# Patient Record
Sex: MALE | Race: WHITE
[De-identification: names, ages, dates, MRNs, and addresses within clinical notes are randomized per-mention and may not be internally consistent; named-entity substitution may affect disease eponyms.]

---

## 2018-05-04 ENCOUNTER — HOSPITAL ENCOUNTER (EMERGENCY)
Dept: HOSPITAL 91 - E/R | Age: 23
Discharge: HOME | End: 2018-05-04
Payer: MEDICAID

## 2018-05-04 ENCOUNTER — HOSPITAL ENCOUNTER (EMERGENCY)
Age: 23
Discharge: HOME | End: 2018-05-04

## 2018-05-04 DIAGNOSIS — R21: Primary | ICD-10-CM

## 2018-05-04 DIAGNOSIS — J45.909: ICD-10-CM

## 2018-05-04 PROCEDURE — 99284 EMERGENCY DEPT VISIT MOD MDM: CPT

## 2022-11-04 ENCOUNTER — HOSPITAL ENCOUNTER (INPATIENT)
Dept: HOSPITAL 54 - ER | Age: 27
LOS: 2 days | Discharge: LEFT BEFORE DISCHARGE | DRG: 917 | End: 2022-11-06
Attending: INTERNAL MEDICINE | Admitting: NURSE PRACTITIONER
Payer: COMMERCIAL

## 2022-11-04 VITALS — SYSTOLIC BLOOD PRESSURE: 129 MMHG | DIASTOLIC BLOOD PRESSURE: 81 MMHG

## 2022-11-04 VITALS — BODY MASS INDEX: 27.62 KG/M2 | WEIGHT: 197.31 LBS | HEIGHT: 71 IN

## 2022-11-04 VITALS — DIASTOLIC BLOOD PRESSURE: 80 MMHG | SYSTOLIC BLOOD PRESSURE: 128 MMHG

## 2022-11-04 VITALS — DIASTOLIC BLOOD PRESSURE: 81 MMHG | SYSTOLIC BLOOD PRESSURE: 129 MMHG

## 2022-11-04 DIAGNOSIS — Z88.8: ICD-10-CM

## 2022-11-04 DIAGNOSIS — Z53.29: ICD-10-CM

## 2022-11-04 DIAGNOSIS — J96.01: ICD-10-CM

## 2022-11-04 DIAGNOSIS — F29: ICD-10-CM

## 2022-11-04 DIAGNOSIS — F90.9: ICD-10-CM

## 2022-11-04 DIAGNOSIS — M62.82: ICD-10-CM

## 2022-11-04 DIAGNOSIS — J69.0: ICD-10-CM

## 2022-11-04 DIAGNOSIS — T43.622A: Primary | ICD-10-CM

## 2022-11-04 DIAGNOSIS — R45.1: ICD-10-CM

## 2022-11-04 DIAGNOSIS — E87.20: ICD-10-CM

## 2022-11-04 DIAGNOSIS — Z88.5: ICD-10-CM
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
## 2022-11-04 DIAGNOSIS — Z88.0: ICD-10-CM

## 2022-11-04 DIAGNOSIS — N17.0: ICD-10-CM

## 2022-11-04 DIAGNOSIS — G92.8: ICD-10-CM

## 2022-11-04 LAB
ALBUMIN SERPL BCP-MCNC: 4.2 G/DL (ref 3.4–5)
ALP SERPL-CCNC: 93 U/L (ref 46–116)
ALT SERPL W P-5'-P-CCNC: 34 U/L (ref 12–78)
APAP SERPL-MCNC: < 10 UG/ML (ref 10–30)
AST SERPL W P-5'-P-CCNC: 40 U/L (ref 15–37)
BASE EXCESS BLDA CALC-SCNC: -5 MMOL/L
BASOPHILS # BLD AUTO: 0 K/UL (ref 0–0.2)
BASOPHILS NFR BLD AUTO: 0.2 % (ref 0–2)
BILIRUB DIRECT SERPL-MCNC: 0.2 MG/DL (ref 0–0.2)
BILIRUB SERPL-MCNC: 0.6 MG/DL (ref 0.2–1)
BILIRUB UR QL STRIP: (no result)
BUN SERPL-MCNC: 9 MG/DL (ref 7–18)
CALCIUM SERPL-MCNC: 9.4 MG/DL (ref 8.5–10.1)
CHLORIDE SERPL-SCNC: 106 MMOL/L (ref 98–107)
CK SERPL-CCNC: 674 U/L (ref 39–308)
CO2 SERPL-SCNC: 26 MMOL/L (ref 21–32)
COLOR UR: YELLOW
CREAT SERPL-MCNC: 1.5 MG/DL (ref 0.6–1.3)
DEPRECATED SQUAMOUS URNS QL MICRO: (no result) /HPF
EOSINOPHIL NFR BLD AUTO: 0.1 % (ref 0–6)
ETHANOL SERPL-MCNC: < 3 MG/DL (ref 0–0)
GLUCOSE SERPL-MCNC: 103 MG/DL (ref 74–106)
GLUCOSE UR STRIP-MCNC: NEGATIVE MG/DL
HCT VFR BLD AUTO: 46 % (ref 39–51)
HGB BLD-MCNC: 16 G/DL (ref 13.5–17.5)
INHALED O2 CONCENTRATION: 100 %
INTRINSIC PEEP RESPIRATORY: 5 CM H2O
LEUKOCYTE ESTERASE UR QL STRIP: NEGATIVE
LYMPHOCYTES NFR BLD AUTO: 1.9 K/UL (ref 0.8–4.8)
LYMPHOCYTES NFR BLD AUTO: 10.2 % (ref 20–44)
MCHC RBC AUTO-ENTMCNC: 35 G/DL (ref 31–36)
MCV RBC AUTO: 86 FL (ref 80–96)
MONOCYTES NFR BLD AUTO: 0.9 K/UL (ref 0.1–1.3)
MONOCYTES NFR BLD AUTO: 4.7 % (ref 2–12)
NEUTROPHILS # BLD AUTO: 15.8 K/UL (ref 1.8–8.9)
NEUTROPHILS NFR BLD AUTO: 84.8 % (ref 43–81)
NITRITE UR QL STRIP: NEGATIVE
PCO2 TEMP ADJ BLDA: 41.2 MMHG (ref 35–45)
PEEP SETTING VENT: 500 ML
PH TEMP ADJ BLDA: 7.32 [PH] (ref 7.35–7.45)
PH UR STRIP: 6 [PH] (ref 5–8)
PLATELET # BLD AUTO: 342 K/UL (ref 150–450)
PO2 TEMP ADJ BLDA: 397.2 MMHG (ref 75–100)
POTASSIUM SERPL-SCNC: 3.7 MMOL/L (ref 3.5–5.1)
PROT SERPL-MCNC: 7.8 G/DL (ref 6.4–8.2)
PROT UR QL STRIP: (no result) MG/DL
RBC # BLD AUTO: 5.34 MIL/UL (ref 4.5–6)
RBC #/AREA URNS HPF: (no result) /HPF (ref 0–2)
SET RATE, BG: 20
SODIUM SERPL-SCNC: 145 MMOL/L (ref 136–145)
UROBILINOGEN UR STRIP-MCNC: 2 EU/DL
VENTILATION MODE VENT: (no result)
WBC #/AREA URNS HPF: (no result) /HPF
WBC #/AREA URNS HPF: (no result) /HPF (ref 0–3)
WBC NRBC COR # BLD AUTO: 18.6 K/UL (ref 4.3–11)

## 2022-11-04 PROCEDURE — G0480 DRUG TEST DEF 1-7 CLASSES: HCPCS

## 2022-11-04 PROCEDURE — 0BH17EZ INSERTION OF ENDOTRACHEAL AIRWAY INTO TRACHEA, VIA NATURAL OR ARTIFICIAL OPENING: ICD-10-PCS | Performed by: EMERGENCY MEDICINE

## 2022-11-04 PROCEDURE — G0378 HOSPITAL OBSERVATION PER HR: HCPCS

## 2022-11-04 PROCEDURE — C9113 INJ PANTOPRAZOLE SODIUM, VIA: HCPCS

## 2022-11-04 PROCEDURE — 5A1945Z RESPIRATORY VENTILATION, 24-96 CONSECUTIVE HOURS: ICD-10-PCS

## 2022-11-04 RX ADMIN — ENOXAPARIN SODIUM SCH MG: 40 INJECTION SUBCUTANEOUS at 22:47

## 2022-11-04 RX ADMIN — PROPOFOL PRN MLS/HR: 10 INJECTION, EMULSION INTRAVENOUS at 22:44

## 2022-11-04 RX ADMIN — PROPOFOL PRN MLS/HR: 10 INJECTION, EMULSION INTRAVENOUS at 23:43

## 2022-11-04 RX ADMIN — SODIUM CHLORIDE PRN MLS/HR: 9 INJECTION, SOLUTION INTRAVENOUS at 22:38

## 2022-11-04 RX ADMIN — PIPERACILLIN SODIUM AND TAZOBACTAM SODIUM SCH MLS/HR: .375; 3 INJECTION, POWDER, LYOPHILIZED, FOR SOLUTION INTRAVENOUS at 22:45

## 2022-11-04 NOTE — NUR
PATIENT STARTS MOVING- AWAKE ABOVE PROPOFOL INF. INCREASED- TITRATED TO EFFECT 
BP-135/83 CA-106 SATS-100%

## 2022-11-04 NOTE — NUR
CALLED POISON CONTROL [phone] PER [facility] AMID: 

CT OF ABDOMIN & PELVIC IF BAGGIE FOUND DO ACTIVATED CHARCOAL. IF NO BAGGIE,  
CONTINUE SUPPORTIVE CARE. PLACE SEIZURE PRECAUTION IN PLACE. IN UNLIKELY EVENT 
OF HYPERTHERMIA USE COOLING MEASSURE. CHECK CK FOR RHABDO. ALSO, DO LABS FOR 
TYLENOL & ASA.

## 2022-11-04 NOTE — NUR
PT BIBRA FROM STREETS ACCOMPANIED BY [employer], ANXIOUS AND RESTLESS APPEARING, 
TACHYCARDIC AND DIAPHORETIC PTA. PER REPORT, PT INGESTED UNKNOWN QUANTITY OF 
POSSIBLE "METHAMPETHAMINES"  WHILW BEING ARRESTED. ABRASSIONS NOTED TO R SIDED 
CHEST AND FLANK AREA, PT IS VERBALLY RESPONSIVE. GOWNED AND PLACED ON MONITOR.

## 2022-11-04 NOTE — NUR
Pt Escorted by [employer] in Handcuffs Appears highly Anxious/gets easily 
agitated/restless/sweating. Skin color flushed. Tends to hyperventilate at 
times. MD at bedside on arrival with orders

## 2022-11-04 NOTE — NUR
RT NOTE

FIO2 TITRATED TO 40% FIO2


-------------------------------------------------------------------------------

Addendum: 11/04/22 at 2350 by MALIK SMITH RT

-------------------------------------------------------------------------------

Amended: Links added.

## 2022-11-04 NOTE — NUR
Patient became altered and unable to respond to verbal/painful stimuli. 
Obtunded. Dr Alfaro made aware with orders to prepare for RSI

## 2022-11-04 NOTE — NUR
PT IS NOTED W UNCONTROLLED GENERALIZED BODY TREMORS, TACHYCARDIC IN 'S 
DR KEEN MADE AWARE. ATIVAN 2MG IVP GIVEN PER ERMD VERBAL ORDER.

## 2022-11-04 NOTE — NUR
RT



Pt intubated in ER #5 by MD Alfaro, with 7.5 ETT- 25cm @ Lip. Settings provided by MD- 
tolerating well. ETT placement verified via X-Ray + Bilateral breath sounds. Ambu bag at 
bedside. 1 hr post intubation ABG scheduled @1930. Will continue to monitor.

## 2022-11-04 NOTE — NUR
RT NOTE

LATE ENTRY

ABG taken and results given to MD. Fio2 titrated to 50% per md request.

-------------------------------------------------------------------------------

Addendum: 11/04/22 at 2350 by MALIK SMITH RT

-------------------------------------------------------------------------------

Amended: Links added.

## 2022-11-05 VITALS — DIASTOLIC BLOOD PRESSURE: 78 MMHG | SYSTOLIC BLOOD PRESSURE: 117 MMHG

## 2022-11-05 VITALS — SYSTOLIC BLOOD PRESSURE: 113 MMHG | DIASTOLIC BLOOD PRESSURE: 74 MMHG

## 2022-11-05 VITALS — SYSTOLIC BLOOD PRESSURE: 118 MMHG | DIASTOLIC BLOOD PRESSURE: 71 MMHG

## 2022-11-05 VITALS — DIASTOLIC BLOOD PRESSURE: 77 MMHG | SYSTOLIC BLOOD PRESSURE: 117 MMHG

## 2022-11-05 VITALS — SYSTOLIC BLOOD PRESSURE: 125 MMHG | DIASTOLIC BLOOD PRESSURE: 78 MMHG

## 2022-11-05 VITALS — SYSTOLIC BLOOD PRESSURE: 116 MMHG | DIASTOLIC BLOOD PRESSURE: 75 MMHG

## 2022-11-05 VITALS — SYSTOLIC BLOOD PRESSURE: 123 MMHG | DIASTOLIC BLOOD PRESSURE: 81 MMHG

## 2022-11-05 VITALS — DIASTOLIC BLOOD PRESSURE: 76 MMHG | SYSTOLIC BLOOD PRESSURE: 122 MMHG

## 2022-11-05 VITALS — SYSTOLIC BLOOD PRESSURE: 121 MMHG | DIASTOLIC BLOOD PRESSURE: 83 MMHG

## 2022-11-05 VITALS — DIASTOLIC BLOOD PRESSURE: 85 MMHG | SYSTOLIC BLOOD PRESSURE: 124 MMHG

## 2022-11-05 VITALS — SYSTOLIC BLOOD PRESSURE: 114 MMHG | DIASTOLIC BLOOD PRESSURE: 70 MMHG

## 2022-11-05 VITALS — DIASTOLIC BLOOD PRESSURE: 72 MMHG | SYSTOLIC BLOOD PRESSURE: 112 MMHG

## 2022-11-05 VITALS — SYSTOLIC BLOOD PRESSURE: 117 MMHG | DIASTOLIC BLOOD PRESSURE: 77 MMHG

## 2022-11-05 VITALS — DIASTOLIC BLOOD PRESSURE: 74 MMHG | SYSTOLIC BLOOD PRESSURE: 117 MMHG

## 2022-11-05 VITALS — DIASTOLIC BLOOD PRESSURE: 75 MMHG | SYSTOLIC BLOOD PRESSURE: 113 MMHG

## 2022-11-05 VITALS — SYSTOLIC BLOOD PRESSURE: 119 MMHG | DIASTOLIC BLOOD PRESSURE: 70 MMHG

## 2022-11-05 VITALS — DIASTOLIC BLOOD PRESSURE: 72 MMHG | SYSTOLIC BLOOD PRESSURE: 115 MMHG

## 2022-11-05 VITALS — SYSTOLIC BLOOD PRESSURE: 108 MMHG | DIASTOLIC BLOOD PRESSURE: 65 MMHG

## 2022-11-05 VITALS — SYSTOLIC BLOOD PRESSURE: 108 MMHG | DIASTOLIC BLOOD PRESSURE: 69 MMHG

## 2022-11-05 VITALS — SYSTOLIC BLOOD PRESSURE: 106 MMHG | DIASTOLIC BLOOD PRESSURE: 63 MMHG

## 2022-11-05 VITALS — SYSTOLIC BLOOD PRESSURE: 116 MMHG | DIASTOLIC BLOOD PRESSURE: 73 MMHG

## 2022-11-05 VITALS — DIASTOLIC BLOOD PRESSURE: 76 MMHG | SYSTOLIC BLOOD PRESSURE: 121 MMHG

## 2022-11-05 LAB
BASE EXCESS BLDA CALC-SCNC: -3.3 MMOL/L
BASOPHILS # BLD AUTO: 0 K/UL (ref 0–0.2)
BASOPHILS NFR BLD AUTO: 0.1 % (ref 0–2)
BUN SERPL-MCNC: 8 MG/DL (ref 7–18)
CALCIUM SERPL-MCNC: 8.1 MG/DL (ref 8.5–10.1)
CHLORIDE SERPL-SCNC: 109 MMOL/L (ref 98–107)
CHOLEST SERPL-MCNC: 101 MG/DL (ref ?–200)
CK SERPL-CCNC: > 1000 U/L (ref 39–308)
CO2 SERPL-SCNC: 24 MMOL/L (ref 21–32)
CREAT SERPL-MCNC: 1 MG/DL (ref 0.6–1.3)
DO-HGB MFR BLDA: 58.3 MMHG
EOSINOPHIL NFR BLD AUTO: 0.1 % (ref 0–6)
GLUCOSE SERPL-MCNC: 84 MG/DL (ref 74–106)
HCT VFR BLD AUTO: 40 % (ref 39–51)
HDLC SERPL-MCNC: 29 MG/DL (ref 40–60)
HGB BLD-MCNC: 14.1 G/DL (ref 13.5–17.5)
INHALED O2 CONCENTRATION: 30 %
INTRINSIC PEEP RESPIRATORY: 5 CM H2O
LDLC SERPL DIRECT ASSAY-MCNC: 63 MG/DL (ref 0–99)
LYMPHOCYTES NFR BLD AUTO: 2.2 K/UL (ref 0.8–4.8)
LYMPHOCYTES NFR BLD AUTO: 25 % (ref 20–44)
MAGNESIUM SERPL-MCNC: 2.5 MG/DL (ref 1.8–2.4)
MCHC RBC AUTO-ENTMCNC: 35 G/DL (ref 31–36)
MCV RBC AUTO: 87 FL (ref 80–96)
MONOCYTES NFR BLD AUTO: 0.7 K/UL (ref 0.1–1.3)
MONOCYTES NFR BLD AUTO: 8.2 % (ref 2–12)
NEUTROPHILS # BLD AUTO: 5.9 K/UL (ref 1.8–8.9)
NEUTROPHILS NFR BLD AUTO: 66.6 % (ref 43–81)
PCO2 TEMP ADJ BLDA: 31.7 MMHG (ref 35–45)
PEEP SETTING VENT: 500 ML
PH TEMP ADJ BLDA: 7.42 [PH] (ref 7.35–7.45)
PHOSPHATE SERPL-MCNC: 3.7 MG/DL (ref 2.5–4.9)
PLATELET # BLD AUTO: 223 K/UL (ref 150–450)
PO2 TEMP ADJ BLDA: 118.4 MMHG (ref 75–100)
POTASSIUM SERPL-SCNC: 3.3 MMOL/L (ref 3.5–5.1)
RBC # BLD AUTO: 4.63 MIL/UL (ref 4.5–6)
SAO2 % BLDA: 97.9 % (ref 92–98.5)
SET RATE, BG: 20
SODIUM SERPL-SCNC: 142 MMOL/L (ref 136–145)
TRIGL SERPL-MCNC: 107 MG/DL (ref 30–150)
VENTILATION MODE VENT: (no result)
WBC NRBC COR # BLD AUTO: 8.9 K/UL (ref 4.3–11)

## 2022-11-05 RX ADMIN — POTASSIUM CHLORIDE SCH MLS/HR: 200 INJECTION, SOLUTION INTRAVENOUS at 14:05

## 2022-11-05 RX ADMIN — PIPERACILLIN SODIUM AND TAZOBACTAM SODIUM SCH MLS/HR: .375; 3 INJECTION, POWDER, LYOPHILIZED, FOR SOLUTION INTRAVENOUS at 03:54

## 2022-11-05 RX ADMIN — PROPOFOL PRN MLS/HR: 10 INJECTION, EMULSION INTRAVENOUS at 03:11

## 2022-11-05 RX ADMIN — PROPOFOL PRN MLS/HR: 10 INJECTION, EMULSION INTRAVENOUS at 16:29

## 2022-11-05 RX ADMIN — PROPOFOL PRN MLS/HR: 10 INJECTION, EMULSION INTRAVENOUS at 19:40

## 2022-11-05 RX ADMIN — PROPOFOL PRN MLS/HR: 10 INJECTION, EMULSION INTRAVENOUS at 01:41

## 2022-11-05 RX ADMIN — PROPOFOL PRN MLS/HR: 10 INJECTION, EMULSION INTRAVENOUS at 14:04

## 2022-11-05 RX ADMIN — POTASSIUM CHLORIDE SCH MLS/HR: 200 INJECTION, SOLUTION INTRAVENOUS at 12:24

## 2022-11-05 RX ADMIN — SODIUM CHLORIDE PRN MLS/HR: 9 INJECTION, SOLUTION INTRAVENOUS at 07:22

## 2022-11-05 RX ADMIN — PROPOFOL PRN MLS/HR: 10 INJECTION, EMULSION INTRAVENOUS at 18:12

## 2022-11-05 RX ADMIN — SODIUM CHLORIDE PRN MLS/HR: 9 INJECTION, SOLUTION INTRAVENOUS at 17:24

## 2022-11-05 RX ADMIN — SODIUM CHLORIDE PRN MLS/HR: 9 INJECTION, SOLUTION INTRAVENOUS at 22:42

## 2022-11-05 RX ADMIN — PROPOFOL PRN MLS/HR: 10 INJECTION, EMULSION INTRAVENOUS at 07:19

## 2022-11-05 RX ADMIN — PROPOFOL PRN MLS/HR: 10 INJECTION, EMULSION INTRAVENOUS at 12:21

## 2022-11-05 RX ADMIN — PROPOFOL PRN MLS/HR: 10 INJECTION, EMULSION INTRAVENOUS at 21:18

## 2022-11-05 RX ADMIN — SODIUM CHLORIDE PRN MLS/HR: 9 INJECTION, SOLUTION INTRAVENOUS at 15:28

## 2022-11-05 RX ADMIN — PROPOFOL PRN MLS/HR: 10 INJECTION, EMULSION INTRAVENOUS at 23:11

## 2022-11-05 RX ADMIN — SODIUM CHLORIDE SCH MG: 9 INJECTION, SOLUTION INTRAVENOUS at 08:04

## 2022-11-05 RX ADMIN — PROPOFOL PRN MLS/HR: 10 INJECTION, EMULSION INTRAVENOUS at 10:47

## 2022-11-05 RX ADMIN — ENOXAPARIN SODIUM SCH MG: 40 INJECTION SUBCUTANEOUS at 20:43

## 2022-11-05 RX ADMIN — PROPOFOL PRN MLS/HR: 10 INJECTION, EMULSION INTRAVENOUS at 05:14

## 2022-11-05 RX ADMIN — PROPOFOL PRN MLS/HR: 10 INJECTION, EMULSION INTRAVENOUS at 15:36

## 2022-11-05 RX ADMIN — PROPOFOL PRN MLS/HR: 10 INJECTION, EMULSION INTRAVENOUS at 09:13

## 2022-11-05 NOTE — NUR
ICU/LVN

POISION CONTROL CALLED TO GET AN UPDATE ABOUT THIS PT, SPOKE TO [employer]. ASKED WHEN WE PLAN 
TO EXTUBATE THIS . GAVE ALL INFORMATION. NO NEW ORDERS.

## 2022-11-05 NOTE — NUR
RN OPENING NOTE



PT OBSERVED IN BED. PT IS SEDATED AT THIS TIME WITH 100MCG/HR OF DIPRIVAN AND ON MECHANICAL 
VENT WITH ALL PRESCRIBED SETTINGS TOLERATING WELL WITH NO SIGNS OF LABORED BREATHING OR 
DISTRESS O2 SAT 97%. IV ACCESS FRANNY MIDLINE AND RFA 18G INFUSING WITH DIPRIVAN @100MCG AND NS 
@125ML/HR. FC IS IN PLACE DRAINING URINE TO GRAVITY. PT IS NPO AT THIS TIME. PT HAS BL SOFT 
WRIST RESTRAINTS. BED IS LOCKED IN LOWEST POSITION X 2 BED RAILS UP AND ALL SAFETY 
PRECAUTIONS ARE IN PLACE. WILL CONTINUE TO MONITOR THIS SHIFT.

## 2022-11-06 VITALS — DIASTOLIC BLOOD PRESSURE: 72 MMHG | SYSTOLIC BLOOD PRESSURE: 113 MMHG

## 2022-11-06 VITALS — SYSTOLIC BLOOD PRESSURE: 116 MMHG | DIASTOLIC BLOOD PRESSURE: 73 MMHG

## 2022-11-06 VITALS — SYSTOLIC BLOOD PRESSURE: 118 MMHG | DIASTOLIC BLOOD PRESSURE: 78 MMHG

## 2022-11-06 VITALS — SYSTOLIC BLOOD PRESSURE: 115 MMHG | DIASTOLIC BLOOD PRESSURE: 75 MMHG

## 2022-11-06 VITALS — SYSTOLIC BLOOD PRESSURE: 120 MMHG | DIASTOLIC BLOOD PRESSURE: 73 MMHG

## 2022-11-06 VITALS — SYSTOLIC BLOOD PRESSURE: 118 MMHG | DIASTOLIC BLOOD PRESSURE: 73 MMHG

## 2022-11-06 VITALS — DIASTOLIC BLOOD PRESSURE: 78 MMHG | SYSTOLIC BLOOD PRESSURE: 114 MMHG

## 2022-11-06 VITALS — SYSTOLIC BLOOD PRESSURE: 125 MMHG | DIASTOLIC BLOOD PRESSURE: 81 MMHG

## 2022-11-06 VITALS — SYSTOLIC BLOOD PRESSURE: 117 MMHG | DIASTOLIC BLOOD PRESSURE: 77 MMHG

## 2022-11-06 VITALS — SYSTOLIC BLOOD PRESSURE: 118 MMHG | DIASTOLIC BLOOD PRESSURE: 74 MMHG

## 2022-11-06 VITALS — SYSTOLIC BLOOD PRESSURE: 138 MMHG | DIASTOLIC BLOOD PRESSURE: 90 MMHG

## 2022-11-06 VITALS — DIASTOLIC BLOOD PRESSURE: 76 MMHG | SYSTOLIC BLOOD PRESSURE: 117 MMHG

## 2022-11-06 LAB
BASOPHILS # BLD AUTO: 0 K/UL (ref 0–0.2)
BASOPHILS NFR BLD AUTO: 0.2 % (ref 0–2)
BUN SERPL-MCNC: 6 MG/DL (ref 7–18)
CALCIUM SERPL-MCNC: 7.8 MG/DL (ref 8.5–10.1)
CHLORIDE SERPL-SCNC: 111 MMOL/L (ref 98–107)
CO2 SERPL-SCNC: 22 MMOL/L (ref 21–32)
CREAT SERPL-MCNC: 0.9 MG/DL (ref 0.6–1.3)
EOSINOPHIL NFR BLD AUTO: 0.4 % (ref 0–6)
GLUCOSE SERPL-MCNC: 76 MG/DL (ref 74–106)
HCT VFR BLD AUTO: 39 % (ref 39–51)
HGB BLD-MCNC: 13.3 G/DL (ref 13.5–17.5)
LYMPHOCYTES NFR BLD AUTO: 1.5 K/UL (ref 0.8–4.8)
LYMPHOCYTES NFR BLD AUTO: 19.1 % (ref 20–44)
MAGNESIUM SERPL-MCNC: 2 MG/DL (ref 1.8–2.4)
MCHC RBC AUTO-ENTMCNC: 34 G/DL (ref 31–36)
MCV RBC AUTO: 87 FL (ref 80–96)
MONOCYTES NFR BLD AUTO: 0.5 K/UL (ref 0.1–1.3)
MONOCYTES NFR BLD AUTO: 6.2 % (ref 2–12)
NEUTROPHILS # BLD AUTO: 5.8 K/UL (ref 1.8–8.9)
NEUTROPHILS NFR BLD AUTO: 74.1 % (ref 43–81)
PHOSPHATE SERPL-MCNC: 2.7 MG/DL (ref 2.5–4.9)
PLATELET # BLD AUTO: 196 K/UL (ref 150–450)
POTASSIUM SERPL-SCNC: 3.4 MMOL/L (ref 3.5–5.1)
RBC # BLD AUTO: 4.44 MIL/UL (ref 4.5–6)
SODIUM SERPL-SCNC: 142 MMOL/L (ref 136–145)
WBC NRBC COR # BLD AUTO: 7.8 K/UL (ref 4.3–11)

## 2022-11-06 RX ADMIN — PROPOFOL PRN MLS/HR: 10 INJECTION, EMULSION INTRAVENOUS at 03:39

## 2022-11-06 RX ADMIN — PROPOFOL PRN MLS/HR: 10 INJECTION, EMULSION INTRAVENOUS at 01:00

## 2022-11-06 RX ADMIN — SODIUM CHLORIDE PRN MLS/HR: 9 INJECTION, SOLUTION INTRAVENOUS at 03:30

## 2022-11-06 RX ADMIN — PROPOFOL PRN MLS/HR: 10 INJECTION, EMULSION INTRAVENOUS at 07:24

## 2022-11-06 RX ADMIN — POTASSIUM CHLORIDE SCH MLS/HR: 200 INJECTION, SOLUTION INTRAVENOUS at 11:39

## 2022-11-06 RX ADMIN — SODIUM CHLORIDE PRN MLS/HR: 9 INJECTION, SOLUTION INTRAVENOUS at 09:39

## 2022-11-06 RX ADMIN — PROPOFOL PRN MLS/HR: 10 INJECTION, EMULSION INTRAVENOUS at 05:30

## 2022-11-06 RX ADMIN — SODIUM CHLORIDE SCH MG: 9 INJECTION, SOLUTION INTRAVENOUS at 09:22

## 2022-11-06 RX ADMIN — PROPOFOL PRN MLS/HR: 10 INJECTION, EMULSION INTRAVENOUS at 01:51

## 2022-11-06 RX ADMIN — POTASSIUM CHLORIDE SCH MLS/HR: 200 INJECTION, SOLUTION INTRAVENOUS at 11:00

## 2022-11-06 NOTE — NUR
RN OPENING NOTE



PT OBSERVED IN BED. PT IS SEDATED AT THIS TIME WITH 100MCG/HR OF DIPRIVAN AND ON MECHANICAL 
VENT WITH ALL PRESCRIBED SETTINGS TOLERATING WELL WITH NO SIGNS OF LABORED BREATHING OR 
DISTRESS O2 %. IV ACCESS FRANNY MIDLINE AND RFA 18G INFUSING WITH DIPRIVAN @100MCG AND 
NS @150ML/HR. FC IS IN PLACE DRAINING URINE TO GRAVITY. PT IS NPO AT THIS TIME. PT HAS BL 
SOFT WRIST RESTRAINTS. BED IS LOCKED IN LOWEST POSITION X 2 BED RAILS UP AND ALL SAFETY 
PRECAUTIONS ARE IN PLACE. WILL CONTINUE TO MONITOR THIS SHIFT.

## 2022-11-06 NOTE — NUR
RN NOTE: EXTUBATION



PT ABLE TO FOLLOW SIMPLE COMMANDS AFTER BEING WEENED FROM DIPRIVAN. PT EXTUBATED AT THIS 
TIME AND IS STABLE. PT NOT ABLE TO SPEAK CLEAR AND COMPLETE SENTENCES. WILL CONTINUE TO 
MONITOR.

## 2022-11-06 NOTE — NUR
RN NOTE: PT AMA



PT HAS BEEN ABLE TO SPEAK AND COMPLETE SENTENCES. ASSESSED PT FOR IMPROVED STEADY GAIT. PT 
GAIT HAS IMPROVED. PT STATED MULTIPLE TIMES THAT HE WANTS TO LEAVE AMA AND REFUSES TO SIGN 
AMA FORM. PT HAS BECOME PHYSICALLY AND VERBALLY AGGRESSIVE DESPITE VERBAL REDIRECTION FROM 
THIS NURSE AND STAFF. ELVI RANDLE WAS CALLED AND PT REGROUPED FOR SHORT TIME. PT DOES NOT 
STATE INTENTIONS OF HURTING HIMSELF OR STAFF IN HOSPITAL. PT'S GAIT AND BALANCE CONTINUED TO 
IMPROVE AND WAS ESCORTED OUT OF HOSPITAL BY THIS NURSE AND CHARGE NURSE.

## 2022-11-06 NOTE — NUR
RT

PER DR THOMPSON PATIENT WAS WEANED AND EXTUBATED. PATIENT RESPONDING TO VERBAL COMMANDS. NO 
SOB, ON ROOM. KRISTY HUGO AWARE.

-------------------------------------------------------------------------------

Addendum: 11/06/22 at 1129 by DECLAN ROMAN RT

-------------------------------------------------------------------------------

Amended: Links added.